# Patient Record
Sex: FEMALE | Race: WHITE | Employment: OTHER | URBAN - METROPOLITAN AREA
[De-identification: names, ages, dates, MRNs, and addresses within clinical notes are randomized per-mention and may not be internally consistent; named-entity substitution may affect disease eponyms.]

---

## 2018-03-20 ENCOUNTER — HOSPITAL ENCOUNTER (OUTPATIENT)
Dept: NUTRITION | Age: 63
Discharge: HOME | End: 2018-03-20
Payer: COMMERCIAL

## 2018-03-20 PROCEDURE — 97802 MEDICAL NUTRITION INDIV IN: CPT

## 2018-03-21 NOTE — PROGRESS NOTES
[address] [place] [address] 51, 45 [facility], [place], 70 [facility]  Phone: [phone]  Fax: [phone]   Nutrition Assessment  Medical Nutrition Therapy   Outpatient Initial Evaluation         Patient Name: Ashlyn Engel : 1955   Treatment Diagnosis: Pre diabetes; obesity   Referral Source: Peggy Arreguin MD Start of Care [facility]): 3/21/2018     Gender: female Age: 61 y.o. Ht: 62 In Wt: 225.4 lb  kg   BMI: 41.2 BMR   Male  BMR Female 1530     Past Medical History includes: HTN; HLD     Pertinent Medications:   Vit D     Biochemical Data:   A1C 6.1;      Subjective/Assessment:   Pt stated she has gained 10-20 lbs over the past 6 mos due to increased stress. Her daughter, granddaughter and mother all moved in and require extra assistance. Pt stated she was prescribed metformin, but has not started taking it yet because she would like to try to manage her BGs with diet and exercise first.  Pt is not currently exercising, but reported she is ready and motivated to make any necessary changes in her diet and eating habits. Pt will not have much support from her family, as they are all very picky and prefer unhealthier food choices. Current Eating Patterns: Pt stated she has recently cut back on her carbohydrate and sugar intake. Her diet remains proportionally high in carbohydrates and fat and low in protein. She eats inconsistently from day to day. Due to her family's picky palate, pt may prepare 2 or 3 different meals for dinner to please everyone. Pt admitted she drinks 1-2 mixed drinks (using a diet mixer) up to 3-4d/wk. Estimate Needs   Calories:  1300 Protein: 98 Carbs: 130 Fat: 43   Kcal/day  g/day  g/day  g/day        percent: 30  40  30               Education & Recommendations provided: Educated pt on the pathophysiology of Type II Diabetes, insulin resistance and the rationale for dietary modifications and increased activity. Educated pt macronutrient composition of various foods and provided specific recommendations for intake at each scheduled meal and snack. Also discussed the importance of establishing a consistent lifestyle and eating schedule to promote a more efficient metabolism. Handouts Provided: []  Carbohydrates  []  Protein  []  Fiber  []  Serving Sizes  [x]  Meal and Snack Ideas  []  Food Journals []  Diabetes  []  Cholesterol  []  Sodium  [x]  Meal Builder  [x]  Diet Plan  []  Others:   Information Reviewed with: Pt and pt's daughter, Tyler Latham to Change Stage: []  Pre-contemplative    [x]  Contemplative  []  Preparation               []  Action                  []  Maintenance   Potential Barriers to Learning: []  Decline in memory    []  Language barrier   []  Other:  []  Emotional                  []  Limited mobility  []  Lack of motivation     [] Vision, hearing or cognitive impairment   Expected Compliance:  Fair      Nutritional Goal - To promote lifestyle changes to result in:    [x]  Weight loss  [x]  Improved diabetic control  [x]  Decreased cholesterol levels  [x]  Decreased blood pressure  []  Weight maintenance []  Preventing any interactions associated with food allergies  []  Adequate weight gain toward goal weight  []  Other:        Patient Goals:  SMART goals 1. Always combine and balance carbohydrate and protein        2.   Eat 2-3 hrs after snacks and 4-5 hrs after meals     Dietitian Signature: Mary Mattson MS, RD, CSSD Date: 3/21/2018   Follow-up: Tues Apr 17 at 11:00 Time: 3:16 PM

## 2018-05-02 ENCOUNTER — HOSPITAL ENCOUNTER (OUTPATIENT)
Dept: NUTRITION | Age: 63
Discharge: HOME | End: 2018-05-02
Payer: COMMERCIAL

## 2018-05-02 PROCEDURE — 97803 MED NUTRITION INDIV SUBSEQ: CPT

## 2018-05-02 NOTE — PROGRESS NOTES
NUTRITION  FOLLOW-UP TREATMENT NOTE  Patient Name: David Lozada         Date: 2018  : 1955    YES Patient  Verified  Diagnosis: prediabetes; obesity   In time:   36             Out time:   1200   Total Treatment Time (min):   30     SUBJECTIVE/ASSESSMENT    Changes in medication or medical history? Any new allergies, surgeries or procedures?    /NO    If yes, update Summary List           Current Wt: 221.2 Previous Wt: 225.4 Wt Change: -4.2     Achievement of Goals: Pt has not been exercising as consistently as before. However, she has made the attempt to eat more regularly throughout the day, although she still skips her morning snack most days. She has increased her intake of lean protein as well. Pt stated she still prepares a separate meal for herself because her family refuses to eat the healthier foods. Pt expressed some disappointment in her progress, as she had expected to lose weight faster. She remains motivated and plans to reintroduce exercise into her lifestyle more regularly. Patient Education:  [x]  Review current plan with patient   []  Other:    Handouts/  Information Provided: []  Carbohydrates  []  Protein  []  Fiber  []  Serving Sizes  []  Fluids  []  General guidelines []  Diabetes  []  Cholesterol  []  Sodium  []  SBGM  []  Food Journals  []  Others:      New Patient Goals: 1. Stick with carb budget at every meal and snack  2.  Don't skip meals or snacks     PLAN    [x]  Continue on current plan []  Follow-up PRN   []  Discharge due to :    [x]  Next appt:  at 10:00     Dietitian: Celia Pino MS, RD, CSSD    Date: 2018 Time: 1:58 PM

## 2018-06-12 ENCOUNTER — HOSPITAL ENCOUNTER (OUTPATIENT)
Dept: NUTRITION | Age: 63
Discharge: HOME | End: 2018-06-12
Payer: COMMERCIAL

## 2018-06-12 PROCEDURE — 97803 MED NUTRITION INDIV SUBSEQ: CPT

## 2018-06-12 NOTE — PROGRESS NOTES
NUTRITION  FOLLOW-UP TREATMENT NOTE  Patient Name: Stephanie Vega         Date: 2018  : 1955    YES Patient  Verified  Diagnosis: pre DM; obesity   In time:   1100             Out time:   1130   Total Treatment Time (min):   30     SUBJECTIVE/ASSESSMENT    Changes in medication or medical history? Any new allergies, surgeries or procedures? YES   If yes, update Summary List   Pt is no longer taking diuretic or statin. Current Wt: 223 Previous Wt: 221.2 Wt Change: +1.8     Achievement of Goals: Pt stated her diet has been improving each day. She is taking the time most days to stop to eat a snack a the scheduled times. Also, pt stated she has increased her consumption of free veggies and is trying to encourage her family to follow. Pt's family members remain unsupportive, but are not yet attempting to sabotage her efforts to improve her health. Pt goes to the gym 3d/wk where she rides the bike, elyptical or treadmill for 30-45 min and spends 15-20 min doing resistance training exercises. She remains highly motivated to continue to make progress. Water retention, due possibly to the DC of her diuretic, is the likely cause of her weight gain today. Patient Education:  [x]  Review current plan with patient   []  Other:    Handouts/  Information Provided: []  Carbohydrates  []  Protein  []  Fiber  []  Serving Sizes  []  Fluids  []  General guidelines []  Diabetes  []  Cholesterol  []  Sodium  []  SBGM  []  Food Journals  []  Others:      New Patient Goals: 1. Keep a food journal daily  2. Pay attention to urine color and frequency each day to determine hydration level  3. Be consistent with carb intake  4.  Incorporate 45-60min low intensity cardiovascular exercise x 5d     PLAN    [x]  Continue on current plan []  Follow-up PRN   []  Discharge due to :    [x]  Next appt: Tues Aug 14 at 10:00     Dietitian: Karan Alvarez MS, RD, CSSD    Date: 2018 Time: 2:34 PM